# Patient Record
Sex: FEMALE | Race: OTHER
[De-identification: names, ages, dates, MRNs, and addresses within clinical notes are randomized per-mention and may not be internally consistent; named-entity substitution may affect disease eponyms.]

---

## 2022-11-25 ENCOUNTER — HOSPITAL ENCOUNTER (INPATIENT)
Dept: HOSPITAL 93 - ER | Age: 67
LOS: 3 days | Discharge: HOME | DRG: 330 | End: 2022-11-28
Attending: INTERNAL MEDICINE | Admitting: INTERNAL MEDICINE
Payer: COMMERCIAL

## 2022-11-25 VITALS — WEIGHT: 195 LBS | BODY MASS INDEX: 34.55 KG/M2 | HEIGHT: 63 IN

## 2022-11-25 DIAGNOSIS — K91.71: ICD-10-CM

## 2022-11-25 DIAGNOSIS — I10: ICD-10-CM

## 2022-11-25 DIAGNOSIS — Z20.822: ICD-10-CM

## 2022-11-25 DIAGNOSIS — K35.32: Primary | ICD-10-CM

## 2022-11-25 PROCEDURE — BW21ZZZ COMPUTERIZED TOMOGRAPHY (CT SCAN) OF ABDOMEN AND PELVIS: ICD-10-PCS | Performed by: EMERGENCY MEDICINE

## 2022-11-25 NOTE — NUR
PACIENTE FEMENINA ALERTA Y ORIENTADA X3, REFIERE SENTIR DOLOR ABDOMINAL QUE SE
IRRADIA A LA ESPLADA Y NAUSEAS. SE MONITOREAN VS Y SE UBICA EN ONSERVACION

## 2022-11-25 NOTE — NUR
PACIENTE EVALUADA POR LA  QUIEN ORDERNA TRATAMIENTO.RN CONNER
REALIZA MUESTRAS BAJO MEDIDAS ASEPTICAS Y ADMINISTRA MEDICAMENTOS.

## 2022-11-27 PROCEDURE — 0DQH4ZZ REPAIR CECUM, PERCUTANEOUS ENDOSCOPIC APPROACH: ICD-10-PCS | Performed by: SURGERY

## 2022-11-27 PROCEDURE — 0DTJ4ZZ RESECTION OF APPENDIX, PERCUTANEOUS ENDOSCOPIC APPROACH: ICD-10-PCS | Performed by: SURGERY
